# Patient Record
Sex: FEMALE | Race: BLACK OR AFRICAN AMERICAN
[De-identification: names, ages, dates, MRNs, and addresses within clinical notes are randomized per-mention and may not be internally consistent; named-entity substitution may affect disease eponyms.]

---

## 2020-03-05 ENCOUNTER — HOSPITAL ENCOUNTER (EMERGENCY)
Dept: HOSPITAL 74 - FER | Age: 50
Discharge: HOME | End: 2020-03-05
Payer: COMMERCIAL

## 2020-03-05 VITALS — TEMPERATURE: 98.2 F

## 2020-03-05 VITALS — DIASTOLIC BLOOD PRESSURE: 74 MMHG | HEART RATE: 71 BPM | SYSTOLIC BLOOD PRESSURE: 124 MMHG

## 2020-03-05 VITALS — BODY MASS INDEX: 33.6 KG/M2

## 2020-03-05 DIAGNOSIS — R51: ICD-10-CM

## 2020-03-05 DIAGNOSIS — I10: Primary | ICD-10-CM

## 2020-03-05 DIAGNOSIS — I45.6: ICD-10-CM

## 2020-03-05 DIAGNOSIS — E78.5: ICD-10-CM

## 2020-03-05 LAB
ALBUMIN SERPL-MCNC: 3.8 G/DL (ref 3.4–5)
ALP SERPL-CCNC: 68 U/L (ref 45–117)
ALT SERPL-CCNC: 27 U/L (ref 13–61)
ANION GAP SERPL CALC-SCNC: 6 MMOL/L (ref 8–16)
AST SERPL-CCNC: 20 U/L (ref 15–37)
BASOPHILS # BLD: 1.6 % (ref 0–2)
BILIRUB SERPL-MCNC: 0.3 MG/DL (ref 0.2–1)
BUN SERPL-MCNC: 11 MG/DL (ref 7–18)
CALCIUM SERPL-MCNC: 8.5 MG/DL (ref 8.5–10)
CHLORIDE SERPL-SCNC: 104 MMOL/L (ref 98–107)
CO2 SERPL-SCNC: 27 MMOL/L (ref 21–32)
CREAT SERPL-MCNC: 0.7 MG/DL (ref 0.55–1.3)
DEPRECATED RDW RBC AUTO: 12.8 % (ref 11.6–15.6)
EOSINOPHIL # BLD: 1.1 % (ref 0–4.5)
GLUCOSE SERPL-MCNC: 97 MG/DL (ref 74–106)
HCT VFR BLD CALC: 42.6 % (ref 32.4–45.2)
HGB BLD-MCNC: 14.2 GM/DL (ref 10.7–15.3)
LYMPHOCYTES # BLD: 30.3 % (ref 8–40)
MCH RBC QN AUTO: 32.5 PG (ref 25.7–33.7)
MCHC RBC AUTO-ENTMCNC: 33.4 G/DL (ref 32–36)
MCV RBC: 97.4 FL (ref 80–96)
MONOCYTES # BLD AUTO: 3.7 % (ref 3.8–10.2)
NEUTROPHILS # BLD: 63.3 % (ref 42.8–82.8)
PLATELET # BLD AUTO: 259 K/MM3 (ref 134–434)
PMV BLD: 9.5 FL (ref 7.5–11.1)
POTASSIUM SERPLBLD-SCNC: 3.7 MMOL/L (ref 3.5–5.1)
PROT SERPL-MCNC: 7.1 G/DL (ref 6.4–8.2)
RBC # BLD AUTO: 4.37 M/MM3 (ref 3.6–5.2)
SODIUM SERPL-SCNC: 137 MMOL/L (ref 136–145)
WBC # BLD AUTO: 7.2 K/MM3 (ref 4–10.8)

## 2020-03-05 PROCEDURE — 3E033GC INTRODUCTION OF OTHER THERAPEUTIC SUBSTANCE INTO PERIPHERAL VEIN, PERCUTANEOUS APPROACH: ICD-10-PCS

## 2020-03-05 PROCEDURE — 3E033NZ INTRODUCTION OF ANALGESICS, HYPNOTICS, SEDATIVES INTO PERIPHERAL VEIN, PERCUTANEOUS APPROACH: ICD-10-PCS

## 2020-03-05 NOTE — EKG
Test Reason : 

Blood Pressure : ***/*** mmHG

Vent. Rate : 065 BPM     Atrial Rate : 065 BPM

   P-R Int : 202 ms          QRS Dur : 086 ms

    QT Int : 390 ms       P-R-T Axes : 047 065 061 degrees

   QTc Int : 405 ms

 

NORMAL SINUS RHYTHM

SEPTAL INFARCT , AGE UNDETERMINED

T WAVE ABNORMALITY, CONSIDER LATERAL ISCHEMIA

ABNORMAL ECG

WHEN COMPARED WITH ECG OF 18-SEP-2002 15:34,

S[facility] IS NO LONGER PRESENT

Confirmed by THAD BERNARD MD ([id number]) on 3/5/2020 2:04:48 PM

 

Referred By: MARSHALL HUFF           Confirmed By:THAD BERNARD MD

## 2020-03-05 NOTE — PDOC
Attending Attestation





- Resident


Resident Name: Mikie Bardales





- ED Attending Attestation


I have performed the following: I have examined & evaluated the patient, The 

case was reviewed & discussed with the resident, I agree w/resident's findings &

plan





- HPI


HPI: 





03/05/20 14:28


49-year-old female with history of hypertension controlled on amlodipine 

presents for evaluation of elevated blood pressure in the setting of headache 

for 2 days.  Patient  has history of similar headaches, described as gradual in 

onset and retro-orbital, persistent for 2 days which is not unusual, not 

associated with any vision change/speech change/nausea/vomiting/focal deficit, 

no fevers or chills.  Today, at work, school nurse noted patient's blood 

pressure to be elevated at 170/100, so she presents for evaluation.  Patient has

no symptoms of endorgan injury.  She is compliant with her medications, but 

states that her primary physician is planning to increase her amlodipine dose 

because of elevated blood pressure in the past, however she is resistant sec

ondary to a side effect of leg edema at the increased dose.





- Physicial Exam


PE: 





03/05/20 14:33


Blood pressure initially 170/102, now 122/70 on my examination after receiving 

pain medication for headache


Well-appearing lying comfortably in stretcher speaking full sentences and joking

and smiling


Pupils are equal round reactive to light, extraocular movements are intact


No JVD


Heart is regular without murmur, lungs are clear


Abdomen benign


No edema





- Medical Decision Making





03/05/20 14:33


49-year-old female with history of headache and hypertension presents with 

elevated blood pressure in the setting of 2 days of gradual onset headache.  

Headache has no red flags on history or physical exam, resolved after Tylenol 

and Reglan.


Elevated blood pressure has no red flags on history or physical exam, no 

evidence of endorgan injury.  Blood pressure improved after pain medications for

headache. 





Labs are within normal limits


EKG shows no acute abnormalities with chronic T wave abnormalities dating back 

to 2017


Blood pressure improved


Counseled on medication options and follow-up with PCP, as well as follow-up 

with neurologist for possible primary headache diagnosis


Agrees with discharge plan and understands return criteria





**Heart Score/ECG Review


  ** #1


ECG reviewed & interpreted by me at: 13:27


General ECG Interpretation: Sinus Rhythm, Normal Rate (65), Normal Intervals 

(qtc 405), No acute ischemic changes (TWI I/AVL, V4-6)


Compared to previous ECG there are: No significant change (c/w 10/19/16)

## 2020-03-05 NOTE — PDOC
History of Present Illness





- General


Chief Complaint: Blood Pressure Problem


Stated Complaint: BLOOD PRESSURE EVAL


Time Seen by Provider: 03/05/20 12:24


History Source: Patient


Exam Limitations: No Limitations





- History of Present Illness


Initial Comments: 





03/06/20 21:36


49F PMH HTN, HLD, WPW s/p ablation sent in by school RN for elevated BP (170s 

systolic) and dizziness. Pt has had frontal band like headache w/ dizziness 

described as mild room spinning x 2 days. Has had similar HA in the past when 

her BP elevated. Denies f/c, numbness,tingling,focal weakness, vision/hearing 

changes, cp/sob, n/v/d, abdpain. 








Past History





- Past Medical History


Allergies/Adverse Reactions: 


                                    Allergies











Allergy/AdvReac Type Severity Reaction Status Date / Time


 


No Known Allergies Allergy   Verified 03/05/20 12:24











Home Medications: 


Ambulatory Orders





Amlodipine Besylate [Norvasc -] 5 mg PO DAILY 03/05/20 


Icosapent Ethyl [Vascepa] 1 cap PO DAILY 03/05/20 








Cardiac Disorders: Yes (WPW)


HTN: Yes





- Surgical History


Cardiac Surgery: Yes





- Psycho Social/Smoking Cessation Hx


Smoking History: Former smoker


Have you smoked in the past 12 months: Yes


'Breaking Loose' booklet given: 10/17/16


Hx Alcohol Use: Yes


Drug/Substance Use Hx: No





**Review of Systems





- Review of Systems


Able to Perform ROS?: Yes


Comments:: 





03/06/20 21:36





CONSTITUTIONAL: Denies F / C


HEENT: endorses headache, dizziness. Denies changes in vision / hearing, 

diplopia, blurry vision, sore throat, rhinorrhea


RESP: Denies SOB, cough, orthopnea, LILLY


CARD: Denies chest pain, palpitations


GI: Denies N / V / D, abdominal pain


: Denies dysuria


SKIN: Denies rashes


NEURO: Denies numbness, tingling, weakness


MSK: Denies back pain








*Physical Exam





- Physical Exam





03/06/20 21:37





GEN: Well appearing, NAD, comfortable. AAOx3.


HEENT: NC/AT, CN II-XII intact, EOMI, PERRL. No facial asymmetry. Moist mucous 

membranes. Normal voice. Supple neck w/ FROM.


CV: S1/S2, RRR, no m/r/g


LUNG: CTAB, no wheezes, crackles, rales, rhonchi. 


GI: Soft, ndnt, +BS, no guarding, no rebound. No masses.  Neg CVAT b/l. 


MSK: No obvious deformities of all extremities. FROM UE and LE b/l.


SKIN: Warm, dry, no rashes appreciated.


PSYCH: Normal mood and affect.


NEURO: Moving all extremities well. 5/5 UE strength b/l. 5/5 LE strength b/l. 

Sensation symmetric and intact throughout. No ataxia on FTN. No pronator drift. 

Ambulates w/ normal gait. 








ED Treatment Course





- LABORATORY


CBC & Chemistry Diagram: 


                                 03/05/20 13:30





                                 03/05/20 13:30





Medical Decision Making





- Medical Decision Making





03/05/20 12:26


49F PMH HTN, HLD, WPW s/p ablation w/ frontal band like headache and mild 

dizziness sent in by RN for elevated BP. 


Unlikely HTN crisis. Likely frontal tension like headache. Given h/o similar 

headaches and story - unlikely migraine or ICH. 


- EKG


- CBC, CMP


- tylenol, reglan 





03/05/20 13:07


rpt /79





03/05/20 14:43


labs reviewed


pt improved


dc home w/ pcp f/u 








Discharge





- Discharge Information


Problems reviewed: Yes


Clinical Impression/Diagnosis: 


 Headache, Hypertension





Condition: Stable


Disposition: HOME





- Admission


No





- Follow up/Referral


Referrals: 


Mehdi Lewis [Primary Care Provider] - 





- Patient Discharge Instructions


Patient Printed Discharge Instructions:  DI for High Blood Pressure, DI for 

Headache


Additional Instructions: 


Take an extra dose of your amlodipine tonight (3/5/20). 





Follow up with your primary care doctor in the next 3-5 days.


Discuss your concerns about your current antihypertensive regiment - Consider an

alternative Ca-Channel Blocker or ACE-inhibitor. 


Continue your home medications as prescribed. 





For headache; take tylenol or motrin as directed on the label. 


Drink plenty of fluids. 





Return to the nearest Emergency Department if you experience: 


- worsening or change in headache 


- changes in your vision, hearing 


- chest pain, shortness of breath


- fevers 


- anything that concerns you 





- Post Discharge Activity